# Patient Record
Sex: FEMALE | Race: WHITE | NOT HISPANIC OR LATINO | URBAN - METROPOLITAN AREA
[De-identification: names, ages, dates, MRNs, and addresses within clinical notes are randomized per-mention and may not be internally consistent; named-entity substitution may affect disease eponyms.]

---

## 2020-07-22 ENCOUNTER — HOSPITAL ENCOUNTER (OUTPATIENT)
Dept: CARDIOLOGY | Facility: HOSPITAL | Age: 65
Discharge: HOME | End: 2020-07-22
Attending: INTERNAL MEDICINE

## 2024-08-21 ENCOUNTER — LAB (OUTPATIENT)
Dept: ONCOLOGY | Facility: HOSPITAL | Age: 69
End: 2024-08-21
Payer: MEDICARE

## 2024-08-21 ENCOUNTER — CONSULT (OUTPATIENT)
Dept: ONCOLOGY | Facility: HOSPITAL | Age: 69
End: 2024-08-21
Payer: MEDICARE

## 2024-08-21 VITALS
BODY MASS INDEX: 30.98 KG/M2 | TEMPERATURE: 98.3 F | OXYGEN SATURATION: 100 % | SYSTOLIC BLOOD PRESSURE: 148 MMHG | DIASTOLIC BLOOD PRESSURE: 48 MMHG | HEIGHT: 68 IN | HEART RATE: 69 BPM | WEIGHT: 204.4 LBS | RESPIRATION RATE: 16 BRPM

## 2024-08-21 DIAGNOSIS — D50.9 IRON DEFICIENCY ANEMIA, UNSPECIFIED IRON DEFICIENCY ANEMIA TYPE: ICD-10-CM

## 2024-08-21 DIAGNOSIS — D50.9 IRON DEFICIENCY ANEMIA, UNSPECIFIED IRON DEFICIENCY ANEMIA TYPE: Primary | ICD-10-CM

## 2024-08-21 DIAGNOSIS — D64.9 ANEMIA, UNSPECIFIED TYPE: ICD-10-CM

## 2024-08-21 LAB
ALBUMIN SERPL-MCNC: 3.6 G/DL (ref 3.5–5.2)
ALBUMIN/GLOB SERPL: 1 G/DL
ALP SERPL-CCNC: 115 U/L (ref 39–117)
ALT SERPL W P-5'-P-CCNC: 22 U/L (ref 1–33)
ANION GAP SERPL CALCULATED.3IONS-SCNC: 11.5 MMOL/L (ref 5–15)
AST SERPL-CCNC: 22 U/L (ref 1–32)
BASOPHILS # BLD AUTO: 0.08 10*3/MM3 (ref 0–0.2)
BASOPHILS NFR BLD AUTO: 0.7 % (ref 0–1.5)
BILIRUB SERPL-MCNC: 0.5 MG/DL (ref 0–1.2)
BUN SERPL-MCNC: 51 MG/DL (ref 8–23)
BUN/CREAT SERPL: 36.2 (ref 7–25)
CALCIUM SPEC-SCNC: 9.3 MG/DL (ref 8.6–10.5)
CHLORIDE SERPL-SCNC: 95 MMOL/L (ref 98–107)
CO2 SERPL-SCNC: 29.5 MMOL/L (ref 22–29)
CREAT SERPL-MCNC: 1.41 MG/DL (ref 0.57–1)
DEPRECATED RDW RBC AUTO: 61.6 FL (ref 37–54)
EGFRCR SERPLBLD CKD-EPI 2021: 41 ML/MIN/1.73
EOSINOPHIL # BLD AUTO: 0.38 10*3/MM3 (ref 0–0.4)
EOSINOPHIL NFR BLD AUTO: 3.4 % (ref 0.3–6.2)
ERYTHROCYTE [DISTWIDTH] IN BLOOD BY AUTOMATED COUNT: 21.6 % (ref 12.3–15.4)
FERRITIN SERPL-MCNC: 31.59 NG/ML (ref 13–150)
FOLATE SERPL-MCNC: 10.5 NG/ML (ref 4.78–24.2)
GLOBULIN UR ELPH-MCNC: 3.7 GM/DL
GLUCOSE SERPL-MCNC: 100 MG/DL (ref 65–99)
HCT VFR BLD AUTO: 29.8 % (ref 34–46.6)
HGB BLD-MCNC: 8.7 G/DL (ref 12–15.9)
IMM GRANULOCYTES # BLD AUTO: 0.06 10*3/MM3 (ref 0–0.05)
IMM GRANULOCYTES NFR BLD AUTO: 0.5 % (ref 0–0.5)
IRON 24H UR-MRATE: 21 MCG/DL (ref 37–145)
IRON SATN MFR SERPL: 4 % (ref 20–50)
LYMPHOCYTES # BLD AUTO: 1.33 10*3/MM3 (ref 0.7–3.1)
LYMPHOCYTES NFR BLD AUTO: 11.8 % (ref 19.6–45.3)
MCH RBC QN AUTO: 23.2 PG (ref 26.6–33)
MCHC RBC AUTO-ENTMCNC: 29.2 G/DL (ref 31.5–35.7)
MCV RBC AUTO: 79.5 FL (ref 79–97)
MONOCYTES # BLD AUTO: 0.67 10*3/MM3 (ref 0.1–0.9)
MONOCYTES NFR BLD AUTO: 6 % (ref 5–12)
NEUTROPHILS NFR BLD AUTO: 77.6 % (ref 42.7–76)
NEUTROPHILS NFR BLD AUTO: 8.71 10*3/MM3 (ref 1.7–7)
NRBC BLD AUTO-RTO: 0 /100 WBC (ref 0–0.2)
PLATELET # BLD AUTO: 354 10*3/MM3 (ref 140–450)
PMV BLD AUTO: 10.4 FL (ref 6–12)
POTASSIUM SERPL-SCNC: 2.8 MMOL/L (ref 3.5–5.2)
PROT SERPL-MCNC: 7.3 G/DL (ref 6–8.5)
RBC # BLD AUTO: 3.75 10*6/MM3 (ref 3.77–5.28)
SODIUM SERPL-SCNC: 136 MMOL/L (ref 136–145)
T4 FREE SERPL-MCNC: 1.5 NG/DL (ref 0.92–1.68)
TIBC SERPL-MCNC: 524 MCG/DL (ref 298–536)
TRANSFERRIN SERPL-MCNC: 352 MG/DL (ref 200–360)
TSH SERPL DL<=0.05 MIU/L-ACNC: 2.92 UIU/ML (ref 0.27–4.2)
VIT B12 BLD-MCNC: >2000 PG/ML (ref 211–946)
WBC NRBC COR # BLD AUTO: 11.23 10*3/MM3 (ref 3.4–10.8)

## 2024-08-21 PROCEDURE — 80053 COMPREHEN METABOLIC PANEL: CPT

## 2024-08-21 PROCEDURE — 1126F AMNT PAIN NOTED NONE PRSNT: CPT | Performed by: INTERNAL MEDICINE

## 2024-08-21 PROCEDURE — 85025 COMPLETE CBC W/AUTO DIFF WBC: CPT

## 2024-08-21 PROCEDURE — 99204 OFFICE O/P NEW MOD 45 MIN: CPT | Performed by: INTERNAL MEDICINE

## 2024-08-21 PROCEDURE — 82607 VITAMIN B-12: CPT

## 2024-08-21 PROCEDURE — 82728 ASSAY OF FERRITIN: CPT

## 2024-08-21 PROCEDURE — 83540 ASSAY OF IRON: CPT

## 2024-08-21 PROCEDURE — 36415 COLL VENOUS BLD VENIPUNCTURE: CPT

## 2024-08-21 PROCEDURE — 84439 ASSAY OF FREE THYROXINE: CPT

## 2024-08-21 PROCEDURE — G0463 HOSPITAL OUTPT CLINIC VISIT: HCPCS | Performed by: INTERNAL MEDICINE

## 2024-08-21 PROCEDURE — 84443 ASSAY THYROID STIM HORMONE: CPT

## 2024-08-21 PROCEDURE — 84466 ASSAY OF TRANSFERRIN: CPT

## 2024-08-21 PROCEDURE — 82746 ASSAY OF FOLIC ACID SERUM: CPT

## 2024-08-21 RX ORDER — APIXABAN 5 MG/1
TABLET, FILM COATED ORAL
COMMUNITY
Start: 2024-05-13 | End: 2024-08-21

## 2024-08-21 RX ORDER — LANOLIN ALCOHOL/MO/W.PET/CERES
1000 CREAM (GRAM) TOPICAL DAILY
COMMUNITY

## 2024-08-21 RX ORDER — GLIMEPIRIDE 4 MG/1
TABLET ORAL
COMMUNITY
Start: 2024-06-27

## 2024-08-21 RX ORDER — CETIRIZINE HYDROCHLORIDE 10 MG/1
TABLET ORAL
COMMUNITY
Start: 2024-06-27

## 2024-08-21 RX ORDER — SULFAMETHOXAZOLE AND TRIMETHOPRIM 800; 160 MG/1; MG/1
TABLET ORAL
COMMUNITY
Start: 2024-08-19 | End: 2024-08-21

## 2024-08-21 RX ORDER — TICAGRELOR 90 MG/1
TABLET ORAL
COMMUNITY
Start: 2024-06-28

## 2024-08-21 RX ORDER — NITROGLYCERIN 0.4 MG/1
0.4 TABLET SUBLINGUAL
COMMUNITY
Start: 2024-03-15

## 2024-08-21 RX ORDER — SEMAGLUTIDE 1.34 MG/ML
1 INJECTION, SOLUTION SUBCUTANEOUS
COMMUNITY
End: 2024-08-21

## 2024-08-21 RX ORDER — BUMETANIDE 2 MG/1
TABLET ORAL 3 TIMES DAILY
COMMUNITY

## 2024-08-21 RX ORDER — FUROSEMIDE 80 MG
80 TABLET ORAL
COMMUNITY

## 2024-08-21 RX ORDER — ESCITALOPRAM OXALATE 20 MG/1
TABLET ORAL
COMMUNITY
Start: 2024-04-26

## 2024-08-21 RX ORDER — METFORMIN HYDROCHLORIDE 500 MG/1
1000 TABLET, EXTENDED RELEASE ORAL 2 TIMES DAILY
COMMUNITY

## 2024-08-21 RX ORDER — INSULIN GLARGINE 100 [IU]/ML
10 INJECTION, SOLUTION SUBCUTANEOUS DAILY
COMMUNITY

## 2024-08-21 RX ORDER — AMIODARONE HYDROCHLORIDE 200 MG/1
TABLET ORAL
COMMUNITY
Start: 2024-07-17

## 2024-08-21 RX ORDER — EVOLOCUMAB 140 MG/ML
INJECTION, SOLUTION SUBCUTANEOUS
COMMUNITY
Start: 2024-02-27

## 2024-08-21 RX ORDER — SPIRONOLACTONE 50 MG/1
100 TABLET, FILM COATED ORAL DAILY
COMMUNITY

## 2024-08-21 RX ORDER — MULTIVIT-MIN/IRON/FOLIC ACID/K 18-600-40
1 CAPSULE ORAL DAILY
COMMUNITY

## 2024-08-21 RX ORDER — ATORVASTATIN CALCIUM 40 MG/1
40 TABLET, FILM COATED ORAL DAILY
COMMUNITY

## 2024-08-21 RX ORDER — FERROUS SULFATE 325(65) MG
325 TABLET, DELAYED RELEASE (ENTERIC COATED) ORAL
COMMUNITY

## 2024-08-21 RX ORDER — RANOLAZINE 500 MG/1
500 TABLET, EXTENDED RELEASE ORAL 2 TIMES DAILY
COMMUNITY

## 2024-08-21 RX ORDER — CHOLECALCIFEROL (VITAMIN D3) 25 MCG
3000 TABLET ORAL DAILY
COMMUNITY

## 2024-08-21 RX ORDER — ASPIRIN 81 MG/1
81 TABLET, CHEWABLE ORAL DAILY
COMMUNITY
Start: 2024-03-18 | End: 2025-03-19

## 2024-08-21 RX ORDER — PANTOPRAZOLE SODIUM 40 MG/1
TABLET, DELAYED RELEASE ORAL
COMMUNITY
Start: 2024-07-22

## 2024-08-21 RX ORDER — ISOSORBIDE MONONITRATE 60 MG/1
60 TABLET, EXTENDED RELEASE ORAL
COMMUNITY
End: 2024-08-21

## 2024-08-21 RX ORDER — SEMAGLUTIDE 2.68 MG/ML
INJECTION, SOLUTION SUBCUTANEOUS
COMMUNITY
Start: 2024-06-05

## 2024-08-21 NOTE — PROGRESS NOTES
Chief Complaint/Care Team    Iron deficiency anemia secondary to blood loss (chronic)    Charlene Lozada MD Camas, Jennifer, MD    History of Present Illness     Diagnosis: Iron deficiency anemia, secondary to unclear source of blood loss    Hodgkin's Lymphoma diagnosed 1979 s/p chemotherapy and mediastinal RT    B/l mastectomies for HR+ Breast occurred Right Breast in 1992 and  left breast in 2011 pt received chemotherapy then completed tamoxifen which she stopped after 2 years due to side effects    Current Treatment: IV iron as needed    Previous Treatment: [facility] is a 69 y.o. female who presents to [facility]AT[facility] for evaluation of iron deficiency anemia. Pt with history of Hodgkin's Lymphoma diagnosed 1979 s/p chemotherapy and mediastinal RT. Pt also s/p B/l mastectomies for HR+ Breast occurred Right Breast in 1992 and in left breast in 2011 pt received chemotherapy then completed tamoxifen which she stopped after 2 years due to side effects    Patient reports noticing some vaginal bleeding about a week ago, patient use urinary incontinence pads and only changed it once on that day.  Patient reports history of several polyps and a 5 during recent colonoscopy.  She continues to follow-up with [facility] to gastroenterology, reports plan for a capsule study on 9/6/2024.    Patient also has a history of history of coronary artery disease with stent placement, CABG, bioprosthetic MVR and AVR, chronic kidney disease.  Patient reports placement of coronary artery stent in March 2024 reports she is required to be on Brilinta for 6 months minimum.  Patient was hospitalized in July 2024 for anemia 8.7, she received 3 doses of IV iron as well as 1 PRBC transfusion.    EGD from 10/20/2023 was normal, duodenal and gastric biopsies were negative for malignancy.     Today patient reports fatigue, denies any chest pain,  "no reported shortness of air.  Besides vaginal bleeding no other blood loss or melena reported today.  Patient denies any history of IV iron infusion reactions, reports she tolerated last IV iron infusion well that occurred at [facility].  History of Present Illness         Review of Systems   Constitutional:  Positive for fatigue.        Oncology/Hematology History    No history exists.       Objective     Vitals:    08/21/24 1518   BP: 148/48   Pulse: 69   Resp: 16   Temp: 98.3 °F (36.8 °C)   TempSrc: Temporal   SpO2: 100%   Weight: 92.7 kg (204 lb 6.4 oz)   Height: 172.7 cm (68\")   PainSc: 0-No pain     ECOG score: 0         PHQ-9 Total Score:         Physical Exam  Vitals reviewed.   Constitutional:       General: She is not in acute distress.     Appearance: Normal appearance.   HENT:      Head: Normocephalic and atraumatic.   Eyes:      Extraocular Movements: Extraocular movements intact.      Conjunctiva/sclera: Conjunctivae normal.   Pulmonary:      Effort: Pulmonary effort is normal.   Musculoskeletal:      Cervical back: Normal range of motion and neck supple.   Skin:     General: Skin is warm and dry.      Findings: No bruising.   Neurological:      Mental Status: She is oriented to person, place, and time.         Physical Exam        Past Medical History     Past Medical History:   Diagnosis Date    Acid reflux     Allergies     Anemia     Arthritis     Diabetes     Heart disease     Hodgkin lymphoma     Hyperlipidemia     Hypertension      Current Outpatient Medications on File Prior to Visit   Medication Sig Dispense Refill    amiodarone (PACERONE) 200 MG tablet       Ascorbic Acid (Vitamin C) 500 MG capsule Take 1 capsule by mouth Daily.      aspirin 81 MG chewable tablet Chew 1 tablet Daily.      atorvastatin (LIPITOR) 40 MG tablet Take 1 tablet by mouth Daily.      Brilinta 90 MG tablet tablet       bumetanide (BUMEX) 2 MG tablet 3 (Three) Times a Day.      cetirizine (zyrTEC) 10 MG tablet       " cholecalciferol (Vitamin D-1000 Max St) 25 MCG (1000 UT) tablet Take 3 tablets by mouth Daily.      escitalopram (LEXAPRO) 20 MG tablet       glimepiride (AMARYL) 4 MG tablet       insulin glargine (LANTUS, SEMGLEE) 100 UNIT/ML injection Inject 10 Units under the skin into the appropriate area as directed Daily.      metoprolol tartrate (LOPRESSOR) 25 MG tablet       nitroglycerin (NITROSTAT) 0.4 MG SL tablet Place 1 tablet under the tongue Every 5 (Five) Minutes As Needed.      Ozempic, 2 MG/DOSE, 8 MG/3ML solution pen-injector       pantoprazole (PROTONIX) 40 MG EC tablet       vitamin B-12 (CYANOCOBALAMIN) 1000 MCG tablet Take 1 tablet by mouth Daily.      [DISCONTINUED] Diclofenac Sodium (VOLTAREN) 1 % gel gel Apply  topically to the appropriate area as directed.      [DISCONTINUED] Eliquis 5 MG tablet tablet       [DISCONTINUED] sulfamethoxazole-trimethoprim (BACTRIM DS,SEPTRA DS) 800-160 MG per tablet       Evolocumab (Repatha) solution prefilled syringe injection Inject  under the skin into the appropriate area as directed. (Patient not taking: Reported on 8/21/2024)      ferrous sulfate 325 (65 FE) MG EC tablet Take 1 tablet by mouth. (Patient not taking: Reported on 8/21/2024)      furosemide (LASIX) 80 MG tablet Take 1 tablet by mouth. (Patient not taking: Reported on 8/21/2024)      metFORMIN ER (GLUCOPHAGE-XR) 500 MG 24 hr tablet Take 2 tablets by mouth 2 (Two) Times a Day. (Patient not taking: Reported on 8/21/2024)      ranolazine (RANEXA) 500 MG 12 hr tablet Take 1 tablet by mouth 2 (Two) Times a Day.      spironolactone (ALDACTONE) 50 MG tablet Take 2 tablets by mouth Daily.      [DISCONTINUED] Dulaglutide 1.5 MG/0.5ML solution pen-injector Inject 1 Pen under the skin into the appropriate area as directed 1 (One) Time Per Week.      [DISCONTINUED] isosorbide mononitrate (IMDUR) 60 MG 24 hr tablet Take 1 tablet by mouth.      [DISCONTINUED] Semaglutide, 1 MG/DOSE, (Ozempic, 1 MG/DOSE,) 4 MG/3ML  solution pen-injector Inject 1 mg under the skin into the appropriate area as directed.       No current facility-administered medications on file prior to visit.      Allergies   Allergen Reactions    Codeine Other (See Comments)     Cries      Demerol [Meperidine] Hives     Past Surgical History:   Procedure Laterality Date    CARDIAC SURGERY      CHOLECYSTECTOMY      COLONOSCOPY      CORONARY ANGIOPLASTY WITH STENT PLACEMENT      HYSTERECTOMY      MASTECTOMY      UPPER GASTROINTESTINAL ENDOSCOPY       Social History     Socioeconomic History    Marital status:    Tobacco Use    Smoking status: Never    Smokeless tobacco: Never   Vaping Use    Vaping status: Never Used   Substance and Sexual Activity    Alcohol use: Never    Drug use: Never    Sexual activity: Defer     History reviewed. No pertinent family history.    Results     Result Review   The following data was reviewed by: Sabra Pohenix MD on 08/21/2024:  Lab Results   Component Value Date    HGB 8.7 (L) 07/22/2024    HCT 30.8 (L) 07/22/2024    MCV 81.9 07/22/2024     07/22/2024    WBC 9.67 07/22/2024    NEUTROABS 7.41 07/22/2024    LYMPHSABS 1.24 07/22/2024    MONOSABS 0.60 07/22/2024    EOSABS 0.21 07/22/2024    BASOSABS 0.07 07/22/2024     Lab Results   Component Value Date    BUN 24 (H) 10/20/2021    CREATININE 1.49 (H) 10/20/2021     10/20/2021    K 4.0 03/20/2024    CL 99 10/20/2021    CO2 27 10/20/2021    CALCIUM 9.0 10/20/2021    PROTEINTOT 7.0 04/11/2024    ALBUMIN 3.0 (L) 03/20/2024    BILITOT 0.4 05/01/2019    ALKPHOS 107 05/01/2019    AST 33 05/01/2019    ALT 34 05/01/2019     Lab Results   Component Value Date    MG 2.3 07/22/2024    FREET4 1.11 03/20/2024    TSH 2.250 05/01/2019       No radiology results for the last day       Assessment & Plan     Diagnoses and all orders for this visit:    1. Iron deficiency anemia, unspecified iron deficiency anemia type (Primary)  -     CBC & Differential; Future  -     Ferritin;  Future  -     Iron Profile; Future  -     Comprehensive Metabolic Panel; Future  -     Vitamin B12; Future  -     Folate; Future  -     T4, Free; Future  -     TSH; Future  -     CBC & Differential; Future  -     Comprehensive Metabolic Panel; Future  -     Ferritin; Future  -     Iron Profile; Future    2. Anemia, unspecified type  -     T4, Free; Future  -     TSH; Future  -     CBC & Differential; Future  -     Comprehensive Metabolic Panel; Future  -     Ferritin; Future  -     Iron Profile; Future         Christin Almonte is a 69 y.o. female who presents to [facility]ATOLOG[facility] for follow-up regarding iron deficiency anemia. Pt with history of Hodgkin's Lymphoma diagnosed 1979 s/p chemotherapy and mediastinal RT. Pt also s/p B/l mastectomies for HR+ Breast occurred Right Breast in 1992 and in left breast in 2011 pt received chemotherapy then completed tamoxifen which she stopped after 2 years due to side effects      Anemia  -Discussed with patient possible causes of her iron deficiency anemia could be from blood loss, she reports vaginal bleeding, also history of several polyps in her colon, she is undergoing evaluation by [facility] GI with plan for capsule study reported on 9/6/2024, recommend she continue to follow with gastroenterology  - Given report of vaginal bleeding, recommend patient evaluation by GYN, patient reports she would schedule appointment with GYN soon  -UA from July 2024 was negative for blood, patient denies any blood loss in her urine  -Since patient is history of CKD this could be another cause of anemia  -Discussed plan to recheck CBC, CMP, ferritin, iron profile, B12, folate, TSH, free T4 to assess her anemia, we will notify patient if she requires IV iron infusion or PRBC transfusion.    Plan for patient follow-up in 6 weeks with repeat CBC, CMP, iron profile and ferritin.    Please note that portions of this note were completed with a voice recognition  program.    Electronically signed by Sabra Phoenix MD, 08/21/24, 5:02 PM EDT.    Assessment & Plan      Follow Up     I spent 45 minutes caring for Christin on this date of service. This time includes time spent by me in the following activities:preparing for the visit, reviewing tests, obtaining and/or reviewing a separately obtained history, performing a medically appropriate examination and/or evaluation , counseling and educating the patient/family/caregiver, ordering medications, tests, or procedures, referring and communicating with other health care professionals , documenting information in the medical record, independently interpreting results and communicating that information with the patient/family/caregiver, and care coordination    Any chemotherapy or immunotherapy or other systemic therapy treatment plan involves a high risk of complications and/or mortality of patient management.    The patient was seen and examined. Work by the provider also included review and/or ordering of lab tests, review and/or ordering of radiology tests, review and/or ordering of medicine tests, discussion with other physicians or providers, independent review of data, obtaining old records, review/summation of old records, and/or other review.    I have reviewed the family history, social history, and past medical history for this patient. Previous information and data has been reviewed and updated as needed. I have reviewed and verified the chief complaint, history, and other documentation. The patient was interviewed and examined in the clinic and the chart reviewed. The previous observations, recommendations, and conclusions were reviewed including those of other providers.     The plan was discussed with the patient and/or family. The patient was given time to ask questions and these questions were answered. At the conclusion of their visit they had no additional questions or concerns and all questions were answered to  their satisfaction.    Patient was given instructions and counseling regarding her condition or for health maintenance advice. Please see specific information pulled into the AVS if appropriate.       Patient or patient representative verbalized consent for the use of Ambient Listening during the visit with  Sabra Phoeinx MD for chart documentation. 8/21/2024  17:02 EDT

## 2024-09-04 ENCOUNTER — TELEPHONE (OUTPATIENT)
Dept: ONCOLOGY | Facility: HOSPITAL | Age: 69
End: 2024-09-04
Payer: MEDICARE

## 2024-09-04 PROBLEM — D50.0 IRON DEFICIENCY ANEMIA DUE TO CHRONIC BLOOD LOSS: Status: ACTIVE | Noted: 2024-09-04

## 2024-09-04 PROBLEM — K90.9 MALABSORPTION OF IRON: Status: ACTIVE | Noted: 2024-09-04

## 2024-09-04 NOTE — TELEPHONE ENCOUNTER
----- Message from Sabra Phoenix sent at 8/31/2024 11:22 AM EDT -----  Regarding: RE: labs  Please let pt know that CBC showed hemoglobin of 8.7, Iron profile showed low iron saturation of 4%, Iron low at 21, so recommend IV Iron infusion, please place orders for Feraheme, pt without any history of reaction of IV Iron infusion. Please let her know that folate and thyroid labs were normal. Thanks.  ----- Message -----  From: Sabra Phoenix MD  Sent: 8/21/2024   5:01 PM EDT  To: Sabra Phoenix MD  Subject: labs                                             Call pt regarding labs from 8/21/2024

## 2024-09-04 NOTE — TELEPHONE ENCOUNTER
LM for patient regarding lab results as below. Also notified her of IV iron infusion.  We will call to schedule this once approved. Instructed to call back for questions.

## 2024-09-10 ENCOUNTER — TELEPHONE (OUTPATIENT)
Dept: ONCOLOGY | Facility: HOSPITAL | Age: 69
End: 2024-09-10
Payer: MEDICARE

## 2024-09-10 NOTE — TELEPHONE ENCOUNTER
TRIED TO REACH PATIENT IN REGARDS TO SCHEDULING IRON INFUSION, PATIENT HAS BLOCKED OUR NUMBER AND WILL NOT ANSWER CALL. CAN NOT LEAVE MESSAGE FOR PATIENT DUE TO BLOCK.

## 2024-09-25 RX ORDER — SODIUM CHLORIDE 9 MG/ML
20 INJECTION, SOLUTION INTRAVENOUS ONCE
Status: CANCELLED | OUTPATIENT
Start: 2024-09-26

## 2024-09-26 ENCOUNTER — HOSPITAL ENCOUNTER (OUTPATIENT)
Dept: ONCOLOGY | Facility: HOSPITAL | Age: 69
Discharge: HOME | End: 2024-09-26
Admitting: INTERNAL MEDICINE
Payer: MEDICARE

## 2024-09-26 VITALS
OXYGEN SATURATION: 100 % | RESPIRATION RATE: 18 BRPM | DIASTOLIC BLOOD PRESSURE: 64 MMHG | TEMPERATURE: 98.2 F | HEART RATE: 70 BPM | SYSTOLIC BLOOD PRESSURE: 137 MMHG

## 2024-09-26 DIAGNOSIS — D50.0 IRON DEFICIENCY ANEMIA DUE TO CHRONIC BLOOD LOSS: ICD-10-CM

## 2024-09-26 DIAGNOSIS — K90.9 MALABSORPTION OF IRON: Primary | ICD-10-CM

## 2024-09-26 PROCEDURE — 96365 THER/PROPH/DIAG IV INF INIT: CPT

## 2024-09-26 PROCEDURE — 25810000003 SODIUM CHLORIDE 0.9 % SOLUTION: Performed by: INTERNAL MEDICINE

## 2024-09-26 PROCEDURE — 96374 THER/PROPH/DIAG INJ IV PUSH: CPT

## 2024-09-26 PROCEDURE — A9270 NON-COVERED ITEM OR SERVICE: HCPCS | Performed by: INTERNAL MEDICINE

## 2024-09-26 PROCEDURE — 25010000002 FERUMOXYTOL 510 MG/17ML SOLUTION 17 ML VIAL: Performed by: INTERNAL MEDICINE

## 2024-09-26 PROCEDURE — 96375 TX/PRO/DX INJ NEW DRUG ADDON: CPT

## 2024-09-26 PROCEDURE — 63710000001 ACETAMINOPHEN EXTRA STRENGTH 500 MG TABLET: Performed by: INTERNAL MEDICINE

## 2024-09-26 RX ORDER — SODIUM CHLORIDE 9 MG/ML
20 INJECTION, SOLUTION INTRAVENOUS ONCE
Status: COMPLETED | OUTPATIENT
Start: 2024-09-26 | End: 2024-09-26

## 2024-09-26 RX ORDER — FAMOTIDINE 10 MG/ML
20 INJECTION, SOLUTION INTRAVENOUS ONCE
Status: COMPLETED | OUTPATIENT
Start: 2024-09-26 | End: 2024-09-26

## 2024-09-26 RX ORDER — ACETAMINOPHEN 500 MG
1000 TABLET ORAL ONCE
Status: COMPLETED | OUTPATIENT
Start: 2024-09-26 | End: 2024-09-26

## 2024-09-26 RX ADMIN — FAMOTIDINE 20 MG: 10 INJECTION INTRAVENOUS at 14:06

## 2024-09-26 RX ADMIN — ACETAMINOPHEN 1000 MG: 500 TABLET ORAL at 14:06

## 2024-09-26 RX ADMIN — FERUMOXYTOL 1020 MG: 510 INJECTION INTRAVENOUS at 13:40

## 2024-09-26 RX ADMIN — SODIUM CHLORIDE 20 ML/HR: 9 INJECTION, SOLUTION INTRAVENOUS at 13:22

## 2024-11-12 ENCOUNTER — LAB (OUTPATIENT)
Dept: ONCOLOGY | Facility: HOSPITAL | Age: 69
End: 2024-11-12
Payer: MEDICARE

## 2024-11-12 DIAGNOSIS — D64.9 ANEMIA, UNSPECIFIED TYPE: ICD-10-CM

## 2024-11-12 DIAGNOSIS — D50.9 IRON DEFICIENCY ANEMIA, UNSPECIFIED IRON DEFICIENCY ANEMIA TYPE: ICD-10-CM

## 2024-11-12 LAB
ALBUMIN SERPL-MCNC: 3.6 G/DL (ref 3.5–5.2)
ALBUMIN/GLOB SERPL: 1.1 G/DL
ALP SERPL-CCNC: 145 U/L (ref 39–117)
ALT SERPL W P-5'-P-CCNC: 24 U/L (ref 1–33)
ANION GAP SERPL CALCULATED.3IONS-SCNC: 9.6 MMOL/L (ref 5–15)
ANISOCYTOSIS BLD QL: NORMAL
AST SERPL-CCNC: 24 U/L (ref 1–32)
BASOPHILS # BLD AUTO: 0.06 10*3/MM3 (ref 0–0.2)
BASOPHILS NFR BLD AUTO: 1 % (ref 0–1.5)
BILIRUB SERPL-MCNC: 0.4 MG/DL (ref 0–1.2)
BUN SERPL-MCNC: 43 MG/DL (ref 8–23)
BUN/CREAT SERPL: 22.1 (ref 7–25)
BURR CELLS BLD QL SMEAR: NORMAL
CALCIUM SPEC-SCNC: 8.8 MG/DL (ref 8.6–10.5)
CHLORIDE SERPL-SCNC: 99 MMOL/L (ref 98–107)
CO2 SERPL-SCNC: 27.4 MMOL/L (ref 22–29)
CREAT SERPL-MCNC: 1.95 MG/DL (ref 0.57–1)
DEPRECATED RDW RBC AUTO: 80.6 FL (ref 37–54)
EGFRCR SERPLBLD CKD-EPI 2021: 27.8 ML/MIN/1.73
EOSINOPHIL # BLD AUTO: 0.21 10*3/MM3 (ref 0–0.4)
EOSINOPHIL NFR BLD AUTO: 3.5 % (ref 0.3–6.2)
ERYTHROCYTE [DISTWIDTH] IN BLOOD BY AUTOMATED COUNT: 26.2 % (ref 12.3–15.4)
FERRITIN SERPL-MCNC: 113.8 NG/ML (ref 13–150)
GLOBULIN UR ELPH-MCNC: 3.2 GM/DL
GLUCOSE SERPL-MCNC: 151 MG/DL (ref 65–99)
HCT VFR BLD AUTO: 33.6 % (ref 34–46.6)
HGB BLD-MCNC: 10.3 G/DL (ref 12–15.9)
HYPOCHROMIA BLD QL: NORMAL
IMM GRANULOCYTES # BLD AUTO: 0.03 10*3/MM3 (ref 0–0.05)
IMM GRANULOCYTES NFR BLD AUTO: 0.5 % (ref 0–0.5)
IRON 24H UR-MRATE: 36 MCG/DL (ref 37–145)
IRON SATN MFR SERPL: 10 % (ref 20–50)
LYMPHOCYTES # BLD AUTO: 0.92 10*3/MM3 (ref 0.7–3.1)
LYMPHOCYTES NFR BLD AUTO: 15.2 % (ref 19.6–45.3)
MCH RBC QN AUTO: 26.3 PG (ref 26.6–33)
MCHC RBC AUTO-ENTMCNC: 30.7 G/DL (ref 31.5–35.7)
MCV RBC AUTO: 85.7 FL (ref 79–97)
MONOCYTES # BLD AUTO: 0.44 10*3/MM3 (ref 0.1–0.9)
MONOCYTES NFR BLD AUTO: 7.2 % (ref 5–12)
NEUTROPHILS NFR BLD AUTO: 4.41 10*3/MM3 (ref 1.7–7)
NEUTROPHILS NFR BLD AUTO: 72.6 % (ref 42.7–76)
NRBC BLD AUTO-RTO: 0 /100 WBC (ref 0–0.2)
OVALOCYTES BLD QL SMEAR: NORMAL
PLAT MORPH BLD: NORMAL
PLATELET # BLD AUTO: 183 10*3/MM3 (ref 140–450)
PMV BLD AUTO: 10.5 FL (ref 6–12)
POTASSIUM SERPL-SCNC: 4.1 MMOL/L (ref 3.5–5.2)
PROT SERPL-MCNC: 6.8 G/DL (ref 6–8.5)
RBC # BLD AUTO: 3.92 10*6/MM3 (ref 3.77–5.28)
SODIUM SERPL-SCNC: 136 MMOL/L (ref 136–145)
TARGETS BLD QL SMEAR: NORMAL
TIBC SERPL-MCNC: 364 MCG/DL (ref 298–536)
TRANSFERRIN SERPL-MCNC: 244 MG/DL (ref 200–360)
WBC MORPH BLD: NORMAL
WBC NRBC COR # BLD AUTO: 6.07 10*3/MM3 (ref 3.4–10.8)

## 2024-11-12 PROCEDURE — 85007 BL SMEAR W/DIFF WBC COUNT: CPT

## 2024-11-12 PROCEDURE — 80053 COMPREHEN METABOLIC PANEL: CPT

## 2024-11-12 PROCEDURE — 85025 COMPLETE CBC W/AUTO DIFF WBC: CPT

## 2024-11-12 PROCEDURE — 82728 ASSAY OF FERRITIN: CPT

## 2024-11-12 PROCEDURE — 84466 ASSAY OF TRANSFERRIN: CPT

## 2024-11-12 PROCEDURE — 83540 ASSAY OF IRON: CPT

## 2024-11-12 PROCEDURE — 36415 COLL VENOUS BLD VENIPUNCTURE: CPT

## 2024-11-14 ENCOUNTER — OFFICE VISIT (OUTPATIENT)
Dept: ONCOLOGY | Facility: HOSPITAL | Age: 69
End: 2024-11-14
Payer: MEDICARE

## 2024-11-14 VITALS
BODY MASS INDEX: 33.95 KG/M2 | SYSTOLIC BLOOD PRESSURE: 171 MMHG | DIASTOLIC BLOOD PRESSURE: 59 MMHG | TEMPERATURE: 98.2 F | HEART RATE: 61 BPM | HEIGHT: 68 IN | RESPIRATION RATE: 18 BRPM | OXYGEN SATURATION: 100 % | WEIGHT: 224 LBS

## 2024-11-14 DIAGNOSIS — J90 PLEURAL EFFUSION ON LEFT: ICD-10-CM

## 2024-11-14 DIAGNOSIS — R93.3 ABNORMAL FINDINGS ON DIAGNOSTIC IMAGING OF OTHER PARTS OF DIGESTIVE TRACT: ICD-10-CM

## 2024-11-14 DIAGNOSIS — D50.9 IRON DEFICIENCY ANEMIA, UNSPECIFIED IRON DEFICIENCY ANEMIA TYPE: Primary | ICD-10-CM

## 2024-11-14 RX ORDER — LOSARTAN POTASSIUM 25 MG/1
25 TABLET ORAL DAILY
COMMUNITY
Start: 2024-11-04

## 2024-11-14 NOTE — PROGRESS NOTES
Chief Complaint/Care Team   Iron deficiency anemia secondary to blood loss (chronic)Ane    Charlene Lozada MD Camas, Jennifer, MD    History of Present Illness     Diagnosis: Iron deficiency anemia, secondary to unclear source of blood loss    Hodgkin's Lymphoma diagnosed 1979 s/p chemotherapy and mediastinal RT    B/l mastectomies for HR+ Breast occurred Right Breast in 1992 and  left breast in 2011 pt received chemotherapy then completed tamoxifen which she stopped after 2 years due to side effects    Current Treatment: IV iron as needed    Previous Treatment: [facility] a 69 y.[facility]. female who presents to [facility]EMATOLOG[facility] for evaluation of iron deficiency anemia. Pt with history of Hodgkin's Lymphoma diagnosed 1979 s/p chemotherapy and mediastinal RT. Pt also s/p B/l mastectomies for HR+ Breast occurred Right Breast in 1992 and in left breast in 2011 pt received chemotherapy then completed tamoxifen which she stopped after 2 years due to side effects    Patient reports noticing some vaginal bleeding about a week ago, patient use urinary incontinence pads and only changed it once on that day.  Patient reports history of several polyps and a 5 during recent colonoscopy.  She continues to follow-up with [facility] to gastroenterology, reports plan for a capsule study on 9/6/2024.    Patient also has a history of history of coronary artery disease with stent placement, CABG, bioprosthetic MVR and AVR, chronic kidney disease.  Patient reports placement of coronary artery stent in March 2024 reports she is required to be on Brilinta for 6 months minimum.  Patient was hospitalized in July 2024 for anemia 8.7, she received 3 doses of IV iron as well as 1 PRBC transfusion.    EGD from 10/20/2023 was normal, duodenal and gastric biopsies were negative for malignancy.     Today, patient reports fatigue, increased lower  "extremity edema, no reported chest pain, but does report shortness of breath, underwent CT abd/pelvis ordered by nephrologist showed moderate left pleural effusion, no report of blood loss or melena. Patient denies any history of IV iron infusion reactions, reports she tolerated last IV iron infusion well that occurred at [facility]. Pt is pending follow up with her cardiologist.   History of Present Illness         Review of Systems   Constitutional:  Positive for fatigue.        Oncology/Hematology History    No history exists.       Objective     Vitals:    11/14/24 1430   BP: 171/59   Pulse: 61   Resp: 18   Temp: 98.2 °F (36.8 °C)   TempSrc: Temporal   SpO2: 100%   Weight: 102 kg (224 lb)   Height: 172.7 cm (68\")   PainSc: 0-No pain       ECOG score: 0         PHQ-9 Total Score:         Physical Exam  Vitals reviewed. Exam conducted with a chaperone present.   Constitutional:       General: She is not in acute distress.     Appearance: Normal appearance.   HENT:      Head: Normocephalic and atraumatic.   Eyes:      Extraocular Movements: Extraocular movements intact.      Conjunctiva/sclera: Conjunctivae normal.   Pulmonary:      Effort: Pulmonary effort is normal.   Musculoskeletal:      Cervical back: Normal range of motion and neck supple.   Skin:     General: Skin is warm and dry.      Findings: No bruising.   Neurological:      Mental Status: She is oriented to person, place, and time.         Physical Exam        Past Medical History     Past Medical History:   Diagnosis Date    Acid reflux     Allergies     Anemia     Arthritis     Diabetes     Heart disease     Hodgkin lymphoma     Hyperlipidemia     Hypertension      Current Outpatient Medications on File Prior to Visit   Medication Sig Dispense Refill    amiodarone (PACERONE) 200 MG tablet       Ascorbic Acid (Vitamin C) 500 MG capsule Take 1 capsule by mouth Daily.      aspirin 81 MG chewable tablet Chew 1 tablet Daily.      atorvastatin (LIPITOR) 40 MG " tablet Take 1 tablet by mouth Daily.      Brilinta 90 MG tablet tablet       bumetanide (BUMEX) 2 MG tablet 3 (Three) Times a Day.      cetirizine (zyrTEC) 10 MG tablet       cholecalciferol (Vitamin D-1000 Max St) 25 MCG (1000 UT) tablet Take 3 tablets by mouth Daily.      escitalopram (LEXAPRO) 20 MG tablet       glimepiride (AMARYL) 4 MG tablet       insulin glargine (LANTUS, SEMGLEE) 100 UNIT/ML injection Inject 10 Units under the skin into the appropriate area as directed Daily.      losartan (COZAAR) 25 MG tablet Take 1 tablet by mouth Daily.      metoprolol tartrate (LOPRESSOR) 25 MG tablet       nitroglycerin (NITROSTAT) 0.4 MG SL tablet Place 1 tablet under the tongue Every 5 (Five) Minutes As Needed.      Ozempic, 2 MG/DOSE, 8 MG/3ML solution pen-injector       pantoprazole (PROTONIX) 40 MG EC tablet       Evolocumab (Repatha) solution prefilled syringe injection Inject  under the skin into the appropriate area as directed. (Patient not taking: Reported on 11/14/2024)      ferrous sulfate 325 (65 FE) MG EC tablet Take 1 tablet by mouth. (Patient not taking: Reported on 11/14/2024)      furosemide (LASIX) 80 MG tablet Take 1 tablet by mouth. (Patient not taking: Reported on 11/14/2024)      metFORMIN ER (GLUCOPHAGE-XR) 500 MG 24 hr tablet Take 2 tablets by mouth 2 (Two) Times a Day. (Patient not taking: Reported on 11/14/2024)      ranolazine (RANEXA) 500 MG 12 hr tablet Take 1 tablet by mouth 2 (Two) Times a Day. (Patient not taking: Reported on 11/14/2024)      vitamin B-12 (CYANOCOBALAMIN) 1000 MCG tablet Take 1 tablet by mouth Daily. (Patient not taking: Reported on 11/14/2024)      [DISCONTINUED] spironolactone (ALDACTONE) 50 MG tablet Take 2 tablets by mouth Daily.       No current facility-administered medications on file prior to visit.      Allergies   Allergen Reactions    Codeine Other (See Comments)     Cries      Demerol [Meperidine] Hives     Past Surgical History:   Procedure Laterality Date     CARDIAC SURGERY      CHOLECYSTECTOMY      COLONOSCOPY      CORONARY ANGIOPLASTY WITH STENT PLACEMENT      HYSTERECTOMY      MASTECTOMY      UPPER GASTROINTESTINAL ENDOSCOPY       Social History     Socioeconomic History    Marital status:    Tobacco Use    Smoking status: Never    Smokeless tobacco: Never   Vaping Use    Vaping status: Never Used   Substance and Sexual Activity    Alcohol use: Never    Drug use: Never    Sexual activity: Defer     History reviewed. No pertinent family history.    Results     Result Review   The following data was reviewed by: Sabra Phoenix MD on 08/21/2024:  Lab Results   Component Value Date    HGB 10.3 (L) 11/12/2024    HCT 33.6 (L) 11/12/2024    MCV 85.7 11/12/2024     11/12/2024    WBC 6.07 11/12/2024    NEUTROABS 4.41 11/12/2024    LYMPHSABS 0.92 11/12/2024    MONOSABS 0.44 11/12/2024    EOSABS 0.21 11/12/2024    BASOSABS 0.06 11/12/2024     Lab Results   Component Value Date    GLUCOSE 151 (H) 11/12/2024    BUN 43 (H) 11/12/2024    CREATININE 1.95 (H) 11/12/2024     11/12/2024    K 4.1 11/12/2024    CL 99 11/12/2024    CO2 27.4 11/12/2024    CALCIUM 8.8 11/12/2024    PROTEINTOT 6.8 11/12/2024    ALBUMIN 3.6 11/12/2024    BILITOT 0.4 11/12/2024    ALKPHOS 145 (H) 11/12/2024    AST 24 11/12/2024    ALT 24 11/12/2024     Lab Results   Component Value Date    MG 2.3 07/22/2024    FREET4 1.50 08/21/2024    TSH 2.920 08/21/2024       No radiology results for the last day  CT Abdomen Kidney With & Without Contrast    Result Date: 11/11/2024  Impression: Moderate left pleural effusion with some left lower lobe atelectasis. Lag band device. Moderate left renal atrophy likely from chronic renal artery stenosis. 6 cm umbilical hernia containing fat and a portion of the cecum. Electronically Signed: Sergio Valdez MD 2024/11/11 at 12:55 CST Reading Location ID and State: [address] / [place] Tel [phone], Service support  [phone], Fax [phone]     Assessment  & Plan     Diagnoses and all orders for this visit:    1. Iron deficiency anemia, unspecified iron deficiency anemia type (Primary)  -     CBC & Differential; Future  -     Comprehensive Metabolic Panel; Future  -     Ferritin; Future  -     Iron Profile; Future    2. Pleural effusion on left  -     CT Guided Thoracentesis Left; Future  -     Body Fluid Cell Count With Differential - Pleural Fluid, Pleural Cavity; Standing  -     pH, Body Fluid - Pleural Fluid, Pleural Cavity; Standing  -     Albumin, Fluid - Pleural Fluid, Pleural Cavity; Standing  -     Protein, Body Fluid - Pleural Fluid, Pleural Cavity; Standing  -     Lactate Dehydrogenase, Body Fluid - Pleural Fluid, Pleural Cavity; Standing  -     Triglycerides, Body Fluid - Pleural Fluid, Pleural Cavity; Standing  -     Glucose, Body Fluid - Pleural Fluid, Pleural Cavity; Standing  -     AFB Culture - Body Fluid, Pleural Cavity; Standing  -     Amylase, Body Fluid - Pleural Fluid, Pleural Cavity; Standing  -     Non-gynecologic Cytology; Standing  -     Flow Cytometry; Standing  -     Body Fluid Culture - Body Fluid, Pleural Cavity; Standing  -     Anaerobic Culture - Pleural Fluid, Pleural Cavity; Standing  -     Fungus Culture - Body Fluid, Pleural Cavity; Standing  -     KOH Prep - Body Fluid, Pleural Cavity; Standing  -     Fungus Smear - Body Fluid, Pleural Cavity; Standing    3. Abnormal findings on diagnostic imaging of other parts of digestive tract  -     Triglycerides, Body Fluid - Pleural Fluid, Pleural Cavity; Standing           Christin Almonte is a 69 y.o. female who presents to [facility] HEMATOLOGY & ONCOLOGY for follow-up regarding iron deficiency anemia. Pt with history of Hodgkin's Lymphoma diagnosed 1979 s/p chemotherapy and mediastinal RT. Pt also s/p B/l mastectomies for HR+ Breast occurred Right Breast in 1992 and in left breast in 2011 pt received chemotherapy then completed tamoxifen which she stopped after 2 years  due to side effects      Anemia  -Discussed with patient possible causes of her iron deficiency anemia could be from blood loss, she reports vaginal bleeding, also history of several polyps in her colon, she is undergoing evaluation by [facility] GI with plan for capsule study reported on 9/6/2024, recommend she continue to follow with gastroenterology  - Given report of vaginal bleeding, recommend patient evaluation by GYN, patient reports she would schedule appointment with GYN soon  -UA from July 2024 was negative for blood, patient denies any blood loss in her urine  -Since patient is history of CKD this could be another cause of anemia  -Discussed results of most recent CBC and iron studies, which revealed hemoglobin 10.3 improved from 8.72 months ago, white blood cell count normal, plt count normal, ferritin normal 113.8, iron profile revealed improvement in iron saturation, thus we will recheck iron studies next clinic form and assess if she needs IV iron infusion.    Moderate left-sided pleural effusion  - Refer patient to interventional radiology for thoracentesis, also ordered studies to assess for infection versus malignancy  - Recommend patient continue follow-up with cardiologist given lower extremity edema along with  history of heart disease as this could be contribute to her pleural effusion development      Plan for patient follow-up in 6 weeks with repeat CBC, CMP, iron profile and ferritin and discuss results of thoracentesis    Please note that portions of this note were completed with a voice recognition program.    Electronically signed by Sabra Phoenix MD, 11/14/24, 4:33 PM EST.      Assessment & Plan      Follow Up     I spent 30 minutes caring for Christin on this date of service. This time includes time spent by me in the following activities:preparing for the visit, reviewing tests, obtaining and/or reviewing a separately obtained history, performing a medically appropriate examination and/or  evaluation , counseling and educating the patient/family/caregiver, ordering medications, tests, or procedures, referring and communicating with other health care professionals , documenting information in the medical record, independently interpreting results and communicating that information with the patient/family/caregiver, and care coordination    Any chemotherapy or immunotherapy or other systemic therapy treatment plan involves a high risk of complications and/or mortality of patient management.    The patient was seen and examined. Work by the provider also included review and/or ordering of lab tests, review and/or ordering of radiology tests, review and/or ordering of medicine tests, discussion with other physicians or providers, independent review of data, obtaining old records, review/summation of old records, and/or other review.    I have reviewed the family history, social history, and past medical history for this patient. Previous information and data has been reviewed and updated as needed. I have reviewed and verified the chief complaint, history, and other documentation. The patient was interviewed and examined in the clinic and the chart reviewed. The previous observations, recommendations, and conclusions were reviewed including those of other providers.     The plan was discussed with the patient and/or family. The patient was given time to ask questions and these questions were answered. At the conclusion of their visit they had no additional questions or concerns and all questions were answered to their satisfaction.    Patient was given instructions and counseling regarding her condition or for health maintenance advice. Please see specific information pulled into the AVS if appropriate.       Patient or patient representative verbalized consent for the use of Ambient Listening during the visit with  Sabra Phoenix MD for chart documentation. 11/14/2024  17:02 EDT

## 2024-11-15 ENCOUNTER — TELEPHONE (OUTPATIENT)
Dept: ONCOLOGY | Facility: HOSPITAL | Age: 69
End: 2024-11-15
Payer: MEDICARE

## 2024-11-15 DIAGNOSIS — R93.3 ABNORMAL FINDINGS ON DIAGNOSTIC IMAGING OF OTHER PARTS OF DIGESTIVE TRACT: ICD-10-CM

## 2024-11-15 DIAGNOSIS — J90 PLEURAL EFFUSION ON LEFT: Primary | ICD-10-CM

## 2024-12-02 ENCOUNTER — LAB (OUTPATIENT)
Dept: LAB | Facility: HOSPITAL | Age: 69
End: 2024-12-02
Payer: MEDICARE

## 2024-12-02 ENCOUNTER — HOSPITAL ENCOUNTER (OUTPATIENT)
Dept: INTERVENTIONAL RADIOLOGY/VASCULAR | Facility: HOSPITAL | Age: 69
Discharge: HOME | End: 2024-12-02
Payer: MEDICARE

## 2024-12-02 ENCOUNTER — HOSPITAL ENCOUNTER (OUTPATIENT)
Dept: GENERAL RADIOLOGY | Facility: HOSPITAL | Age: 69
Discharge: HOME | End: 2024-12-02
Payer: MEDICARE

## 2024-12-02 VITALS
HEART RATE: 60 BPM | RESPIRATION RATE: 16 BRPM | SYSTOLIC BLOOD PRESSURE: 143 MMHG | OXYGEN SATURATION: 100 % | DIASTOLIC BLOOD PRESSURE: 50 MMHG

## 2024-12-02 DIAGNOSIS — J90 PLEURAL EFFUSION ON LEFT: ICD-10-CM

## 2024-12-02 DIAGNOSIS — R93.3 ABNORMAL FINDINGS ON DIAGNOSTIC IMAGING OF OTHER PARTS OF DIGESTIVE TRACT: ICD-10-CM

## 2024-12-02 LAB
ALBUMIN FLD-MCNC: 1.3 G/DL
AMYLASE FLD-CCNC: 34 U/L
APPEARANCE FLD: ABNORMAL
APTT PPP: 26.7 SECONDS (ref 24.2–34.2)
COLOR FLD: ABNORMAL
GLUCOSE FLD-MCNC: 208 MG/DL
INR PPP: 1.06 (ref 0.86–1.15)
LDH FLD-CCNC: 90 U/L
LYMPHOCYTES NFR FLD MANUAL: 69 %
MESOTHL CELL NFR FLD MANUAL: 1 %
MONOCYTES NFR FLD: 23 %
NEUTROPHILS NFR FLD MANUAL: 7 %
NUC CELL # FLD: 2095 /MM3
PH FLD: 7 [PH]
PLATELET # BLD AUTO: 205 10*3/MM3 (ref 140–450)
PROT FLD-MCNC: 2 G/DL
PROTHROMBIN TIME: 14 SECONDS (ref 11.8–14.9)
RBC # FLD AUTO: 5000 /MM3
TRIGL FLD-MCNC: <9 MG/DL

## 2024-12-02 PROCEDURE — 71045 X-RAY EXAM CHEST 1 VIEW: CPT

## 2024-12-02 PROCEDURE — 25010000002 LIDOCAINE 2% SOLUTION: Performed by: INTERNAL MEDICINE

## 2024-12-02 PROCEDURE — 85730 THROMBOPLASTIN TIME PARTIAL: CPT | Performed by: INTERNAL MEDICINE

## 2024-12-02 PROCEDURE — 85610 PROTHROMBIN TIME: CPT | Performed by: INTERNAL MEDICINE

## 2024-12-02 PROCEDURE — 82042 OTHER SOURCE ALBUMIN QUAN EA: CPT | Performed by: INTERNAL MEDICINE

## 2024-12-02 PROCEDURE — 84157 ASSAY OF PROTEIN OTHER: CPT | Performed by: INTERNAL MEDICINE

## 2024-12-02 PROCEDURE — 83615 LACTATE (LD) (LDH) ENZYME: CPT | Performed by: INTERNAL MEDICINE

## 2024-12-02 PROCEDURE — 88305 TISSUE EXAM BY PATHOLOGIST: CPT | Performed by: INTERNAL MEDICINE

## 2024-12-02 PROCEDURE — 76942 ECHO GUIDE FOR BIOPSY: CPT

## 2024-12-02 PROCEDURE — 85049 AUTOMATED PLATELET COUNT: CPT | Performed by: INTERNAL MEDICINE

## 2024-12-02 PROCEDURE — 87206 SMEAR FLUORESCENT/ACID STAI: CPT | Performed by: INTERNAL MEDICINE

## 2024-12-02 PROCEDURE — 82945 GLUCOSE OTHER FLUID: CPT | Performed by: INTERNAL MEDICINE

## 2024-12-02 PROCEDURE — 87205 SMEAR GRAM STAIN: CPT | Performed by: INTERNAL MEDICINE

## 2024-12-02 PROCEDURE — 87102 FUNGUS ISOLATION CULTURE: CPT | Performed by: INTERNAL MEDICINE

## 2024-12-02 PROCEDURE — 87075 CULTR BACTERIA EXCEPT BLOOD: CPT | Performed by: INTERNAL MEDICINE

## 2024-12-02 PROCEDURE — 87070 CULTURE OTHR SPECIMN AEROBIC: CPT | Performed by: INTERNAL MEDICINE

## 2024-12-02 PROCEDURE — 84478 ASSAY OF TRIGLYCERIDES: CPT | Performed by: INTERNAL MEDICINE

## 2024-12-02 PROCEDURE — 87116 MYCOBACTERIA CULTURE: CPT | Performed by: INTERNAL MEDICINE

## 2024-12-02 PROCEDURE — 82150 ASSAY OF AMYLASE: CPT | Performed by: INTERNAL MEDICINE

## 2024-12-02 PROCEDURE — 83986 ASSAY PH BODY FLUID NOS: CPT | Performed by: INTERNAL MEDICINE

## 2024-12-02 PROCEDURE — 88108 CYTOPATH CONCENTRATE TECH: CPT | Performed by: INTERNAL MEDICINE

## 2024-12-02 PROCEDURE — 89051 BODY FLUID CELL COUNT: CPT | Performed by: INTERNAL MEDICINE

## 2024-12-02 RX ORDER — LIDOCAINE HYDROCHLORIDE 20 MG/ML
20 INJECTION, SOLUTION INFILTRATION; PERINEURAL ONCE
Status: COMPLETED | OUTPATIENT
Start: 2024-12-02 | End: 2024-12-02

## 2024-12-02 RX ADMIN — LIDOCAINE HYDROCHLORIDE 5 ML: 20 INJECTION, SOLUTION INFILTRATION; PERINEURAL at 11:58

## 2024-12-02 RX ADMIN — SODIUM BICARBONATE 1 ML: 84 INJECTION, SOLUTION INTRAVENOUS at 11:58

## 2024-12-03 LAB — Lab: NORMAL

## 2024-12-04 ENCOUNTER — TELEPHONE (OUTPATIENT)
Dept: ONCOLOGY | Facility: HOSPITAL | Age: 69
End: 2024-12-04

## 2024-12-04 LAB
CYTO UR: NORMAL
LAB AP CASE REPORT: NORMAL
LAB AP CLINICAL INFORMATION: NORMAL
PATH REPORT.FINAL DX SPEC: NORMAL
PATH REPORT.GROSS SPEC: NORMAL

## 2024-12-04 NOTE — TELEPHONE ENCOUNTER
Caller: Christin Almonte    Relationship: Self    Best call back number: [phone]    What test was performed: XRAY & US THORACENTESIS    When was the test performed: 12/2/2024    Where was the test performed: [facility]    Additional notes: CALL PATIENT TO GO OVER RESULTS

## 2024-12-05 LAB
BACTERIA FLD CULT: NORMAL
GRAM STN SPEC: NORMAL
GRAM STN SPEC: NORMAL

## 2024-12-06 NOTE — TELEPHONE ENCOUNTER
The pt called back. This nurse informed the pt that Dr Phoenix is in with a pt and will have to call her back. The pt requested that Dr Phoenix call her home phone number: [phone]

## 2024-12-07 LAB — BACTERIA SPEC ANAEROBE CULT: NORMAL

## 2024-12-23 ENCOUNTER — TELEPHONE (OUTPATIENT)
Dept: ONCOLOGY | Facility: HOSPITAL | Age: 69
End: 2024-12-23
Payer: MEDICARE

## 2024-12-23 NOTE — TELEPHONE ENCOUNTER
LVM w/pt about  for [employer] coming back as not eligible.  Advised pt to call the office if she had any questions.

## 2024-12-26 ENCOUNTER — TELEPHONE (OUTPATIENT)
Dept: ONCOLOGY | Facility: HOSPITAL | Age: 69
End: 2024-12-26

## 2024-12-26 NOTE — TELEPHONE ENCOUNTER
Caller: Christin Almonte    Relationship: Self    Best call back number: [phone]    What is the best time to reach you: ANYTIME    Who are you requesting to speak with (clinical staff, provider,  specific staff member): CLINICAL    What was the call regarding: PT REQUESTING HER LAB ORDER FAXED OVER TO [facility] -[phone]

## 2024-12-30 LAB — FUNGUS WND CULT: NORMAL

## 2025-01-13 LAB
MYCOBACTERIUM SPEC CULT: NORMAL
NIGHT BLUE STAIN TISS: NORMAL

## 2025-01-22 ENCOUNTER — TELEPHONE (OUTPATIENT)
Dept: ONCOLOGY | Facility: HOSPITAL | Age: 70
End: 2025-01-22

## 2025-01-22 NOTE — TELEPHONE ENCOUNTER
Caller: KARINE  NOT ON  [facility]    Relationship: SISTER    Best call back number: [phone]    What is the best time to reach you: ANY    Who are you requesting to speak with (clinical staff, provider,  specific staff member): CLINICAL     What was the call regarding: KARINE IS WANTING TO GET ANT INTO SEE DR COLLINS ON MONDAY     EARLY IN THE MORNING OR LATER IN THE AFTERNOON    SHE STATES THAT THERE IS A LUMP ON HER RIGHT BREAST AND IT IS PAINFUL      PLEASE CALL TO ADVISE

## 2025-01-28 ENCOUNTER — TELEPHONE (OUTPATIENT)
Dept: ONCOLOGY | Facility: HOSPITAL | Age: 70
End: 2025-01-28

## 2025-01-28 NOTE — TELEPHONE ENCOUNTER
Caller: KARINE (NOT ON  [facility] - HAD APPT INFO)    Relationship to patient: PATIENT'S SISTER    Best call back number:     [phone]     Chief complaint: PT SISTER STATES THAT THEY WOULD LIKE TO CANCEL THE PT'S APPT FOR TODAY 01/28. THEY ARE HAVING HOSPICE COMING IN AND DO NOT WISH TO R/S

## 2025-01-28 NOTE — TELEPHONE ENCOUNTER
OSW contacted [facility] to verify patient was admitted to hospice. Patient was admitted to hospice services on 1/24/2025.